# Patient Record
Sex: FEMALE | Race: BLACK OR AFRICAN AMERICAN | NOT HISPANIC OR LATINO | URBAN - NONMETROPOLITAN AREA
[De-identification: names, ages, dates, MRNs, and addresses within clinical notes are randomized per-mention and may not be internally consistent; named-entity substitution may affect disease eponyms.]

---

## 2019-06-27 ENCOUNTER — IMPORTED ENCOUNTER (OUTPATIENT)
Dept: URBAN - NONMETROPOLITAN AREA CLINIC 1 | Facility: CLINIC | Age: 19
End: 2019-06-27

## 2019-06-27 PROBLEM — H52.13: Noted: 2019-06-27

## 2019-06-27 PROCEDURE — 92310 CONTACT LENS FITTING OU: CPT

## 2019-06-27 PROCEDURE — S0620 ROUTINE OPHTHALMOLOGICAL EXA: HCPCS

## 2019-06-27 NOTE — PATIENT DISCUSSION
Myopia OUDiscussed refractive status in detail with patient. New glasses and CL Rx given today. Discussed proper lens care replacement and hygiene.

## 2022-04-09 ASSESSMENT — KERATOMETRY
OD_AXISANGLE_DEGREES: 146
OS_AXISANGLE_DEGREES: 026
OS_K2POWER_DIOPTERS: 42.25
OD_K2POWER_DIOPTERS: 42.50
OS_K1POWER_DIOPTERS: 41.75
OD_K1POWER_DIOPTERS: 41.75

## 2022-04-09 ASSESSMENT — TONOMETRY
OS_IOP_MMHG: 18
OD_IOP_MMHG: 18

## 2022-04-09 ASSESSMENT — VISUAL ACUITY
OD_SC: 20/20
OS_SC: 20/20